# Patient Record
Sex: FEMALE | Race: ASIAN | HISPANIC OR LATINO | URBAN - METROPOLITAN AREA
[De-identification: names, ages, dates, MRNs, and addresses within clinical notes are randomized per-mention and may not be internally consistent; named-entity substitution may affect disease eponyms.]

---

## 2018-04-17 ENCOUNTER — INPATIENT (INPATIENT)
Facility: HOSPITAL | Age: 43
LOS: 1 days | Discharge: HOME | DRG: 744 | End: 2018-04-19
Attending: INTERNAL MEDICINE | Admitting: INTERNAL MEDICINE
Payer: COMMERCIAL

## 2018-04-17 ENCOUNTER — TRANSCRIPTION ENCOUNTER (OUTPATIENT)
Age: 43
End: 2018-04-17

## 2018-04-17 VITALS
SYSTOLIC BLOOD PRESSURE: 114 MMHG | TEMPERATURE: 98 F | DIASTOLIC BLOOD PRESSURE: 70 MMHG | OXYGEN SATURATION: 100 % | RESPIRATION RATE: 20 BRPM | HEART RATE: 74 BPM

## 2018-04-17 DIAGNOSIS — N84.0 POLYP OF CORPUS UTERI: ICD-10-CM

## 2018-04-17 DIAGNOSIS — D64.9 ANEMIA, UNSPECIFIED: ICD-10-CM

## 2018-04-17 DIAGNOSIS — Z98.891 HISTORY OF UTERINE SCAR FROM PREVIOUS SURGERY: Chronic | ICD-10-CM

## 2018-04-17 LAB
ALBUMIN SERPL ELPH-MCNC: 4.6 G/DL — SIGNIFICANT CHANGE UP (ref 3.3–5)
ALP SERPL-CCNC: 61 U/L — SIGNIFICANT CHANGE UP (ref 40–120)
ALT FLD-CCNC: 11 U/L — SIGNIFICANT CHANGE UP (ref 10–45)
ANION GAP SERPL CALC-SCNC: 10 MMOL/L — SIGNIFICANT CHANGE UP (ref 5–17)
ANISOCYTOSIS BLD QL: SLIGHT — SIGNIFICANT CHANGE UP
APPEARANCE UR: CLEAR — SIGNIFICANT CHANGE UP
APTT BLD: 27.4 SEC — LOW (ref 27.5–37.4)
AST SERPL-CCNC: 13 U/L — SIGNIFICANT CHANGE UP (ref 10–40)
BASOPHILS # BLD AUTO: 0.1 K/UL — SIGNIFICANT CHANGE UP (ref 0–0.2)
BASOPHILS NFR BLD AUTO: 3.3 % — HIGH (ref 0–2)
BILIRUB SERPL-MCNC: 0.2 MG/DL — SIGNIFICANT CHANGE UP (ref 0.2–1.2)
BILIRUB UR-MCNC: NEGATIVE — SIGNIFICANT CHANGE UP
BLD GP AB SCN SERPL QL: NEGATIVE — SIGNIFICANT CHANGE UP
BUN SERPL-MCNC: 10 MG/DL — SIGNIFICANT CHANGE UP (ref 7–23)
CALCIUM SERPL-MCNC: 9.5 MG/DL — SIGNIFICANT CHANGE UP (ref 8.4–10.5)
CHLORIDE SERPL-SCNC: 102 MMOL/L — SIGNIFICANT CHANGE UP (ref 96–108)
CO2 SERPL-SCNC: 29 MMOL/L — SIGNIFICANT CHANGE UP (ref 22–31)
COLOR SPEC: COLORLESS — SIGNIFICANT CHANGE UP
CREAT SERPL-MCNC: 0.6 MG/DL — SIGNIFICANT CHANGE UP (ref 0.5–1.3)
DIFF PNL FLD: NEGATIVE — SIGNIFICANT CHANGE UP
ELLIPTOCYTES BLD QL SMEAR: SLIGHT — SIGNIFICANT CHANGE UP
EOSINOPHIL # BLD AUTO: 0 K/UL — SIGNIFICANT CHANGE UP (ref 0–0.5)
EOSINOPHIL NFR BLD AUTO: 0.1 % — SIGNIFICANT CHANGE UP (ref 0–6)
GLUCOSE SERPL-MCNC: 92 MG/DL — SIGNIFICANT CHANGE UP (ref 70–99)
GLUCOSE UR QL: NEGATIVE — SIGNIFICANT CHANGE UP
HCG UR QL: NEGATIVE — SIGNIFICANT CHANGE UP
HCT VFR BLD CALC: 19.6 % — CRITICAL LOW (ref 34.5–45)
HGB BLD-MCNC: 5.6 G/DL — CRITICAL LOW (ref 11.5–15.5)
HYPOCHROMIA BLD QL: SIGNIFICANT CHANGE UP
INR BLD: 1.01 RATIO — SIGNIFICANT CHANGE UP (ref 0.88–1.16)
IRON SATN MFR SERPL: 2 % — LOW (ref 14–50)
IRON SATN MFR SERPL: 8 UG/DL — LOW (ref 30–160)
KETONES UR-MCNC: NEGATIVE — SIGNIFICANT CHANGE UP
LEUKOCYTE ESTERASE UR-ACNC: NEGATIVE — SIGNIFICANT CHANGE UP
LYMPHOCYTES # BLD AUTO: 0.9 K/UL — LOW (ref 1–3.3)
LYMPHOCYTES # BLD AUTO: 30.6 % — SIGNIFICANT CHANGE UP (ref 13–44)
MACROCYTES BLD QL: SLIGHT — SIGNIFICANT CHANGE UP
MCHC RBC-ENTMCNC: 16.1 PG — LOW (ref 27–34)
MCHC RBC-ENTMCNC: 28.5 GM/DL — LOW (ref 32–36)
MCV RBC AUTO: 56.5 FL — LOW (ref 80–100)
MICROCYTES BLD QL: SLIGHT — SIGNIFICANT CHANGE UP
MONOCYTES # BLD AUTO: 0.2 K/UL — SIGNIFICANT CHANGE UP (ref 0–0.9)
MONOCYTES NFR BLD AUTO: 5.7 % — SIGNIFICANT CHANGE UP (ref 2–14)
NEUTROPHILS # BLD AUTO: 1.8 K/UL — SIGNIFICANT CHANGE UP (ref 1.8–7.4)
NEUTROPHILS NFR BLD AUTO: 60.4 % — SIGNIFICANT CHANGE UP (ref 43–77)
NITRITE UR-MCNC: NEGATIVE — SIGNIFICANT CHANGE UP
OVALOCYTES BLD QL SMEAR: SLIGHT — SIGNIFICANT CHANGE UP
PH UR: 6.5 — SIGNIFICANT CHANGE UP (ref 5–8)
PLAT MORPH BLD: NORMAL — SIGNIFICANT CHANGE UP
PLATELET # BLD AUTO: 267 K/UL — SIGNIFICANT CHANGE UP (ref 150–400)
POIKILOCYTOSIS BLD QL AUTO: SLIGHT — SIGNIFICANT CHANGE UP
POLYCHROMASIA BLD QL SMEAR: SLIGHT — SIGNIFICANT CHANGE UP
POTASSIUM SERPL-MCNC: 4.2 MMOL/L — SIGNIFICANT CHANGE UP (ref 3.5–5.3)
POTASSIUM SERPL-SCNC: 4.2 MMOL/L — SIGNIFICANT CHANGE UP (ref 3.5–5.3)
PROT SERPL-MCNC: 7.7 G/DL — SIGNIFICANT CHANGE UP (ref 6–8.3)
PROT UR-MCNC: NEGATIVE — SIGNIFICANT CHANGE UP
PROTHROM AB SERPL-ACNC: 10.9 SEC — SIGNIFICANT CHANGE UP (ref 9.8–12.7)
RBC # BLD: 3.46 M/UL — LOW (ref 3.8–5.2)
RBC # FLD: 16.9 % — HIGH (ref 10.3–14.5)
RBC BLD AUTO: ABNORMAL
RH IG SCN BLD-IMP: NEGATIVE — SIGNIFICANT CHANGE UP
RH IG SCN BLD-IMP: NEGATIVE — SIGNIFICANT CHANGE UP
SCHISTOCYTES BLD QL AUTO: SLIGHT — SIGNIFICANT CHANGE UP
SODIUM SERPL-SCNC: 141 MMOL/L — SIGNIFICANT CHANGE UP (ref 135–145)
SP GR SPEC: 1.01 — SIGNIFICANT CHANGE UP (ref 1.01–1.02)
TARGETS BLD QL SMEAR: SLIGHT — SIGNIFICANT CHANGE UP
TIBC SERPL-MCNC: 401 UG/DL — SIGNIFICANT CHANGE UP (ref 220–430)
UIBC SERPL-MCNC: 393 UG/DL — HIGH (ref 110–370)
UROBILINOGEN FLD QL: NEGATIVE — SIGNIFICANT CHANGE UP
WBC # BLD: 3.1 K/UL — LOW (ref 3.8–10.5)
WBC # FLD AUTO: 3.1 K/UL — LOW (ref 3.8–10.5)

## 2018-04-17 PROCEDURE — 74177 CT ABD & PELVIS W/CONTRAST: CPT | Mod: 26

## 2018-04-17 PROCEDURE — 99253 IP/OBS CNSLTJ NEW/EST LOW 45: CPT | Mod: 57

## 2018-04-17 PROCEDURE — 76856 US EXAM PELVIC COMPLETE: CPT | Mod: 26

## 2018-04-17 PROCEDURE — 71275 CT ANGIOGRAPHY CHEST: CPT | Mod: 26

## 2018-04-17 PROCEDURE — 76830 TRANSVAGINAL US NON-OB: CPT | Mod: 26

## 2018-04-17 PROCEDURE — 93010 ELECTROCARDIOGRAM REPORT: CPT

## 2018-04-17 PROCEDURE — 99285 EMERGENCY DEPT VISIT HI MDM: CPT | Mod: 25

## 2018-04-17 RX ORDER — BENZOCAINE AND MENTHOL 5; 1 G/100ML; G/100ML
1 LIQUID ORAL ONCE
Qty: 0 | Refills: 0 | Status: COMPLETED | OUTPATIENT
Start: 2018-04-17 | End: 2018-04-17

## 2018-04-17 RX ORDER — SODIUM CHLORIDE 9 MG/ML
1000 INJECTION INTRAMUSCULAR; INTRAVENOUS; SUBCUTANEOUS ONCE
Qty: 0 | Refills: 0 | Status: COMPLETED | OUTPATIENT
Start: 2018-04-17 | End: 2018-04-17

## 2018-04-17 RX ADMIN — BENZOCAINE AND MENTHOL 1 LOZENGE: 5; 1 LIQUID ORAL at 22:42

## 2018-04-17 RX ADMIN — SODIUM CHLORIDE 1000 MILLILITER(S): 9 INJECTION INTRAMUSCULAR; INTRAVENOUS; SUBCUTANEOUS at 15:12

## 2018-04-17 NOTE — ED PROVIDER NOTE - OBJECTIVE STATEMENT
41 y/o F pmhx DUB, does not follow-up with her obgyn for many years, has 5 children, presenting today with weakness, fatigue, malaise, chest pain and shortness of breath on exertion for the last few days. Her  is a physician, had bloodwork done today 4/17 and noticed to have hemoglobin 5.7 hematocrit 22.7. LMP started 3 days ago, usualy does not know when period is because of continual DUB. SPt also traveled from [facility] 17hr flight last week. No fever, no chills, no abdominal pain.  Pt comes in with Dr. Digna Carlton at bedside and Dr. Rodríguez (), stating admit patient to her service, start work-up for anemia and PE in the ER and admit.

## 2018-04-17 NOTE — CONSULT NOTE ADULT - PROBLEM SELECTOR RECOMMENDATION 9
- Pt not bleeding at this time, no acute gyn intervention needed  - Pt will need an outpatient diagnostic hysteroscopy, D&C, and polypectomy both to r/o malignancy and treat likely polyp and source of her bleeding.  - Pt should be transfused while inpatient, and if bleeding does not reoccur can be discharged with outpatient f/u. Pt advised to find a gyn provider that accepts her insurance to have the procedure done.  - When pt stable for discharge, pt can be discharged with a 5 day course of 1300 mg of tranexamic acid TID to be taken if pt begins to bleed again so as to alleviate her symptoms while outpatient surgical planning is performed.    Pt seen with Dr. Taisha Navarrete, PGY-2  Pager #[phone] - Pt not bleeding at this time, no emergent gyn intervention needed  - Pt will need a diagnostic hysteroscopy, D&C, and polypectomy both to r/o malignancy and treat likely polyp and source of her bleeding. Case is tentatively being added on for tomorrow; if it cannot be done during inpatient stay, would need to be done outpatient  - Pt to be NPO after midnight  - Pt should be transfused while inpatient  - In the case that procedure cannot be done inpatient, then when pt stable for discharge, pt can be discharged with a 5 day course of 1300 mg of tranexamic acid TID to be taken if pt begins to bleed again so as to alleviate her symptoms while outpatient surgical planning is performed. TXA would not be warranted if she has surgery inpatient.    Pt seen with Dr. Sumner, d/w Dr. Daphne Navarrete, PGY-2  Pager #[phone]

## 2018-04-17 NOTE — ED PROVIDER NOTE - PHYSICAL EXAMINATION
GEN: Well Appearing, Nontoxic, NAD. Pale  HEENT: Symm Facies, PERRL, EOMI, MMM, posterior pharynx clear  CV: No JVD/Bruits or stridor;  RRR w/o m/g/r  RESP: CTAB w/o w/r/r  ABD: Soft, nt/nd, +bs, no guarding/rebound, no RUQ tender;  No CVAT  EXT: No lower extremity edema or calf tenderness. WWP, palpable pulses. FROMx4  SKIN: No erythema, lesions or rash  Neuro: Grossly intact, AOX3 with normal speech, CN II-XII intact; Sensation intact, motor 5/5 throughout; gait normal

## 2018-04-17 NOTE — ED PROVIDER NOTE - PROGRESS NOTE DETAILS
gyn pelvic with speculum exam performed by OBGYN resident, scant blood otherwise nontender and unremarkable. will transfuse prbc. -Zoey Schaffer PA-C

## 2018-04-17 NOTE — H&P ADULT - NSHPPHYSICALEXAM_GEN_ALL_CORE
PHYSICAL EXAMINATION:  Vital Signs Last 24 Hrs  T(C): 36.7 (17 Apr 2018 13:38), Max: 36.7 (17 Apr 2018 13:38)  T(F): 98 (17 Apr 2018 13:38), Max: 98 (17 Apr 2018 13:38)  HR: 74 (17 Apr 2018 13:38) (74 - 74)  BP: 114/70 (17 Apr 2018 13:38) (114/70 - 114/70)  BP(mean): --  RR: 20 (17 Apr 2018 13:38) (20 - 20)  SpO2: 100% (17 Apr 2018 13:38) (100% - 100%)  CAPILLARY BLOOD GLUCOSE            GENERAL: NAD, well-groomed,  HEAD:  atraumatic, normocephalic  EYES: sclera anicteric  ENMT: mucous membranes moist  NECK: supple, No JVD  CHEST/LUNG: clear to auscultation bilaterally;    no      rales   ,   no rhonchi,   HEART: normal S1, S2  ABDOMEN: BS+, soft, ND, NT   EXTREMITIES:    no    edema    b/l LEs  NEURO: awake, ,     moves all extremities  SKIN: no     rash

## 2018-04-17 NOTE — CONSULT NOTE ADULT - SUBJECTIVE AND OBJECTIVE BOX
Patient is a 42y old  Female who presents with a chief complaint of cp/sob (17 Apr 2018 15:07)      HPI:  pt  sent  to e r,  for  hb  of  5  has  had  a h/o of  excessive  vaginal bleeding - bleeding daily with passage of large amounts of clots for past 9-12 months.  Possible spontaneous AB 4/2017 ("felt like prior miscarriages")  +generalized weakness, no syncope or LOC +PICA - eats ice "all the time"   +dizziness +sob with exertion  also with  cp and  sob,  for  past  week, with occasional  radiation of  pain to left  arm  pt  seen  in  er   labs, pending    No meds at baseline  Has been feeling some dyspeptic symptoms for past few weeks, started PPI today  intermittently take iron supplement  +recent travel to [place] - returned 1 week ago  no leg swelling  no bleeding gums or bruising noted  no FH of GI or GYN malignancy  last Gyn exam ~4 years ago  no prior EGD or colonoscopy      PAST MEDICAL & SURGICAL HISTORY:  C section x 5  miscarriage x3    Allergies  No Known Allergies    Intolerances        MEDICATIONS  (STANDING):    MEDICATIONS  (PRN):      Social History:    Marital Status:  (  X )    (   ) Single    (   )    (  )   Occupation: homemaker  Lives with: (  ) alone  ( X ) children   ( X ) spouse   (  ) parents  (  ) other    Substance Use (street drugs): (X  ) never used  (  ) other:  Tobacco Usage:  (X   ) never smoked   (   ) former smoker   (   ) current smoker  (     ) pack year  (        ) last cigarette date  Alcohol Usage: none      Family History   IBD (  ) Yes   (X  ) No  GI Malignancy (  )  Yes    ( X ) No    Health Management     Last Colonoscopy -      (     ) Advanced Directives: (     ) None    (      ) DNR    (     ) DNI    (     ) Health Care Proxy:     Review of Systems:    General:  No wt loss, fevers, chills, night sweats,, +gen weakness and fatigue  CV:  see HPI  Resp:  No cough, tachypnea, wheezing +VELASQUEZ  GI:  No pain, No nausea, No vomiting, No diarrhea, No constipatiion, No weight loss, No fever, No pruritis, No rectal bleeding, No tarry stools, No dysphagia,  :  No pain, bleeding, incontinence, nocturia  Muscle:  No pain  Neuro:  No focal weakness, tingling, memory problems  Psych:  No fatigue, insomnia, mood problems, depression  Endocrine:  No polyuria, polydypsia, cold/heat intolerance  Heme:  see HPI  Skin:  No rash, tattoos, scars, edema      Vital Signs Last 24 Hrs  T(C): 36.7 (17 Apr 2018 13:38), Max: 36.7 (17 Apr 2018 13:38)  T(F): 98 (17 Apr 2018 13:38), Max: 98 (17 Apr 2018 13:38)  HR: 74 (17 Apr 2018 13:38) (74 - 74)  BP: 114/70 (17 Apr 2018 13:38) (114/70 - 114/70)  BP(mean): --  RR: 20 (17 Apr 2018 13:38) (20 - 20)  SpO2: 100% (17 Apr 2018 13:38) (100% - 100%)    PHYSICAL EXAM:    Constitutional: NAD, well-developed ++pallor  Neck: No LAD, supple  Respiratory: CTA anteriorly  Cardiovascular: S1 and S2, RRR, no M  Gastrointestinal: BS+, soft, NT/ND  Extremities: No peripheral edema, neg clubbing, cyanosis  Vascular: 2+ peripheral pulses  Neurological: A/O x 3, no focal deficits  Psychiatric: Normal mood, normal affect  Skin: +conjunctival pallor no petecchiae or ecchymosis        LABS:  outpt Hgb 5.7 today at physician office    ER labs testing            RADIOLOGY & ADDITIONAL TESTS:

## 2018-04-17 NOTE — H&P ADULT - ASSESSMENT
pt  with  cp/sob,  admitted  with hb of  5, as  an out  pt   labs, pending here  follow  iron studies  prbc   labs  in  am    gyn  eval, h/o  menorrhagia, no gi  bleeding  cta  chest/  abdomen, pelvis,  pending pt  with  cp/sob,  admitted  with hb of  5,  noted  as  an out  pt   labs, pending here  follow  iron studies  prbc   labs  in  am    gyn  eval pending  , h/o  menorrhagia, no gi  bleeding  cta  chest/  abdomen, pelvis,  pending

## 2018-04-17 NOTE — ED ADULT NURSE NOTE - CHPI ED SYMPTOMS NEG
no dysuria/no fever/no abdominal pain/no vaginal discharge/no vomiting/no pain/no chills/no back pain/no discharge/no nausea

## 2018-04-17 NOTE — H&P ADULT - NSHPREVIEWOFSYSTEMS_GEN_ALL_CORE
REVIEW OF SYSTEMS:    CONSTITUTIONAL: weakness,     fevers      EYES/ENT: No visual changes;    NECK: No pain   RESPIRATORY: No cough,     no   wheezing  , No    shortness of breath  CARDIOVASCULAR: No chest pain, no   palpitations  GASTROINTESTINAL: No abdominal  pain.   No    nausea, vomiting,   no   hematemesis;   No diarrhea,     no   constipation.       No  melena   ,  no     brbpr  GENITOURINARY: No dysuria,/ frequency   NEUROLOGICAL: No  focal  weakness  SKIN   No  rash  PSYCH    Alert

## 2018-04-17 NOTE — ED ADULT NURSE NOTE - OBJECTIVE STATEMENT
Pt bib spouse for eval of weakness after prolonged menstrual period which she states lasted about 1 month and finished about 3-4 days ago.  At its heaviest, she went through 3-4 pads per hour.  Conjunctiva slightly pale, overall skin color is good, skin warm and dry.  Abd soft, non-tender.

## 2018-04-17 NOTE — ED PROVIDER NOTE - MEDICAL DECISION MAKING DETAILS
41 y/o F anemia, vaginal bleeding, shortness of breath and chest pain on exertion recent travel 17h flight and anemic h/h 5.7/22.7 from today's labs. PE very pale otherwise unremarkable. Will obtain orthostatics, labs, ekg, cta chest for PE, ct abdomen and pelvis requested by admitting doctor, ob consult, admission to hospital. DICK 41 y/o F anemia, vaginal bleeding, shortness of breath and chest pain on exertion recent travel 17h flight and anemic h/h 5.7/22.7 from today's labs. has 5 kids last one 4 y ago never follow up with OB  PE very pale otherwise unremarkable. Will obtain orthostatics, labs, ekg, cta chest for PE, ct abdomen and pelvis requested by admitting doctor, ob consult, admission to hospital. LUDY.

## 2018-04-17 NOTE — CONSULT NOTE ADULT - ASSESSMENT
42 year old female presents with severe symptomatic anemia, likely severe iron deficiency anemia given symptoms/pica  No over/brisk GI blood loss  suspect GYN etiology given history    PLAN  -follow up ER labs  -agree with plans for PRBCs  -check iron studies  -recommend GYN eval  -Consider Heme input  -plans noted for CT A/P    Discussed with pt,  (physician) and Medicine attending  Thank you for the courtesy of this consult.    Joshua Carrasquillo PA-C    [facility]  [phone]

## 2018-04-17 NOTE — CONSULT NOTE ADULT - SUBJECTIVE AND OBJECTIVE BOX
43 y/o  with AUB, last episode of bleeding stopped 3 days ago, presenting to ED with severe anemia. Pt has been feeling weak and having occasional CP and VELASQUEZ for the past week. She saw her PMD where a H&H was drawn, which just resulted today with a hgb of 5.7, which is why she was advised to come to the ED. Pt reports having vaginal bleeding more days than not over the past 9-10 months, usually with bleeding only stopping for a few days and then starting up again. She reports using many pads per day during these episodes. She has not seen a gynecologist in years. She denies fevers, chills, vaginal discharge, N/V.  OBHx: c/s x5 with BTL, MAB x1 with D&C, SAB x2  GynHx: As in HPI, reports irregular menses before that but for a more normal interval of time than this  PMH: Denies  SHx: c/s, D&C, and BTL  All: NKDA  Meds: None    VS  T(C): 36.9 (18 @ 16:11)  HR: 74 (18 @ 13:38)  BP: 114/70 (18 @ 13:38)  RR: 18 (18 @ 16:11)  SpO2: 100% (18 @ 16:11)    Physical Exam:  General: NAD  Abdomen: Soft, non-tender, non-distended  Pelvic: Labia wnl w/o lesions or erythema, no discharge or bleeding in vaginal vault, no cervical discharge or erythema, no CMT, uterus not enlarged, adnexa w/o masses and tenderness               5.6    3.1   )-----------( 267      (  @ 15:32 )             19.6      15:32    141  |  102  |  10  ----------------------------<  92  4.2   |  29  |  0.60    Ca    9.5       15:32    TPro  7.7  /  Alb  4.6  /  TBili  0.2  /  DBili  x   /  AST  13  /  ALT  11  /  AlkPhos  61   @ 15:32    PT/INR - (  @ 15:32 )   PT: 10.9 sec;   INR: 1.01 ratio    PTT - (  @ 15:32 )  PTT:27.4 sec    < from: US Transvaginal (18 @ 15:57) >  INTERPRETATION:  CLINICAL INFORMATION: 42-year-old with history of   long-standing vaginal bleeding. Status post 5 C-sections.    Transabdominal and endovaginal ultrasound examination of the pelvis was   performed. No prior studies available for comparison.    The transabdominal scan was performed to assess the uterus and adnexa.   The uterus is anteverted and retroflexed, measuring 9.4 cm in length x 4   cm AP x 5.5 cm transversely. No adnexal masses are seen.    The endovaginal scan was performed to evaluate the endometrium and   ovaries. The endometrium measures 8 mm. An echogenic mass is seen in the  endometrial canal, measuring 3.9 x 1.2 x 2.1 cm and likely represents a   polyp.    The right ovary measures 3.8 x 2.8 x 3.9 cm and demonstrates a luteal   cyst, measuring 2.2 x 1.7 x 2.2 cm.    The left ovary measures 1.9 x 2.7 x 2.4 cm and is normal in appearance.    No free fluid is seen.    Impression: Lobulated endometrial mass suggesting polyp. Malignancy is   considered less likely.    Right luteal cyst.    < end of copied text >

## 2018-04-17 NOTE — CONSULT NOTE ADULT - ATTENDING COMMENTS
Patient with long-standing history of prolonged and heavy menstrual bleeding. Patient was sent in today due to documented anemia by her primary care physician.  Patient admitted to the medicine for transfusion.    Ultrasound today revealed a lobulated endometrial mass suggestive of a polyp measuring 39 x 12 x 21 mm.      Patient currently not bleeding.      Suggested plan of management  Complete blood transfusion  Patient to be scheduled for hysteroscopy, D&C, polypectomy  This may be scheduled as an outpatient  Suggest possible tranexamic acid course to treat next bleeding episode

## 2018-04-17 NOTE — ED PROVIDER NOTE - NS ED ROS FT
Constitutional: No fever or chills. pale  Eyes: No visual changes, eye pain or redness  HEENT: No throat pain, ear pain, nasal pain. No nose bleeding.  CV: chest pain and shortness of breath. no LE edema  Resp: +shortness of breath. no cough  GI: No abd pain. No nausea or vomiting. No diarrhea. No constipation.   : No dysuria, hematuria.   MSK: No musculoskeletal pain  Skin: No rash  Neuro: No headache. No numbness or tingling. No weakness.

## 2018-04-17 NOTE — ED PROVIDER NOTE - ATTENDING CONTRIBUTION TO CARE
43 y/o F anemia, vaginal bleeding, shortness of breath and chest pain on exertion recent travel 17h flight and anemic h/h 5.7/22.7 from today's labs. PE very pale otherwise unremarkable. Will obtain orthostatics, labs, ekg, cta chest for PE, ct abdomen and pelvis requested by admitting doctor, ob consult, admission to hospital. DICK

## 2018-04-17 NOTE — H&P ADULT - HISTORY OF PRESENT ILLNESS
pt  sent  to e r,  for  hb  of  5  has  had  a h/o of  excessive  vaginal bleeding  also with  cp and  sob,  for  past  week  , with occasional  radiation of  pain to left  arm  pt  seen  in  er   labs, pending   cta chest/ abdomen, pending   pt  to get prbc pt  sent  to e r,  for  hb  of  5  has  had  a h/o of  excessive  vaginal bleeding  also with  cp and  sob,  for  past  week  , with occasional  radiation of  pain to left  arm, recent trip   from [facility].   pt  seen  in  er   labs, pending   cta chest/ abdomen, pending   pt  to get prbc

## 2018-04-18 ENCOUNTER — RESULT REVIEW (OUTPATIENT)
Age: 43
End: 2018-04-18

## 2018-04-18 LAB
GLUCOSE BLDC GLUCOMTR-MCNC: 99 MG/DL — SIGNIFICANT CHANGE UP (ref 70–99)
HCT VFR BLD CALC: 26.5 % — LOW (ref 34.5–45)
HCT VFR BLD CALC: 30.9 % — LOW (ref 34.5–45)
HGB BLD-MCNC: 8.2 G/DL — LOW (ref 11.5–15.5)
HGB BLD-MCNC: 9.4 G/DL — LOW (ref 11.5–15.5)
MCHC RBC-ENTMCNC: 19.5 PG — LOW (ref 27–34)
MCHC RBC-ENTMCNC: 20.1 PG — LOW (ref 27–34)
MCHC RBC-ENTMCNC: 30.4 GM/DL — LOW (ref 32–36)
MCHC RBC-ENTMCNC: 31.1 GM/DL — LOW (ref 32–36)
MCV RBC AUTO: 64.2 FL — LOW (ref 80–100)
MCV RBC AUTO: 64.6 FL — LOW (ref 80–100)
PLATELET # BLD AUTO: 211 K/UL — SIGNIFICANT CHANGE UP (ref 150–400)
PLATELET # BLD AUTO: 222 K/UL — SIGNIFICANT CHANGE UP (ref 150–400)
RBC # BLD: 4.1 M/UL — SIGNIFICANT CHANGE UP (ref 3.8–5.2)
RBC # BLD: 4.81 M/UL — SIGNIFICANT CHANGE UP (ref 3.8–5.2)
RBC # FLD: 25.5 % — HIGH (ref 10.3–14.5)
RBC # FLD: 25.6 % — HIGH (ref 10.3–14.5)
WBC # BLD: 3.6 K/UL — LOW (ref 3.8–10.5)
WBC # BLD: 3.9 K/UL — SIGNIFICANT CHANGE UP (ref 3.8–10.5)
WBC # FLD AUTO: 3.6 K/UL — LOW (ref 3.8–10.5)
WBC # FLD AUTO: 3.9 K/UL — SIGNIFICANT CHANGE UP (ref 3.8–10.5)

## 2018-04-18 PROCEDURE — 88305 TISSUE EXAM BY PATHOLOGIST: CPT | Mod: 26

## 2018-04-18 PROCEDURE — 83020 HEMOGLOBIN ELECTROPHORESIS: CPT | Mod: 26

## 2018-04-18 PROCEDURE — 58558 HYSTEROSCOPY BIOPSY: CPT

## 2018-04-18 RX ORDER — BENZOCAINE AND MENTHOL 5; 1 G/100ML; G/100ML
1 LIQUID ORAL ONCE
Qty: 0 | Refills: 0 | Status: COMPLETED | OUTPATIENT
Start: 2018-04-18 | End: 2018-04-18

## 2018-04-18 RX ORDER — IRON SUCROSE 20 MG/ML
200 INJECTION, SOLUTION INTRAVENOUS EVERY 24 HOURS
Qty: 0 | Refills: 0 | Status: DISCONTINUED | OUTPATIENT
Start: 2018-04-18 | End: 2018-04-18

## 2018-04-18 RX ORDER — HYDROMORPHONE HYDROCHLORIDE 2 MG/ML
0.5 INJECTION INTRAMUSCULAR; INTRAVENOUS; SUBCUTANEOUS
Qty: 0 | Refills: 0 | Status: DISCONTINUED | OUTPATIENT
Start: 2018-04-18 | End: 2018-04-18

## 2018-04-18 RX ORDER — IRON SUCROSE 20 MG/ML
100 INJECTION, SOLUTION INTRAVENOUS DAILY
Qty: 0 | Refills: 0 | Status: DISCONTINUED | OUTPATIENT
Start: 2018-04-18 | End: 2018-04-19

## 2018-04-18 RX ORDER — SENNA PLUS 8.6 MG/1
1 TABLET ORAL ONCE
Qty: 0 | Refills: 0 | Status: COMPLETED | OUTPATIENT
Start: 2018-04-18 | End: 2018-04-18

## 2018-04-18 RX ORDER — ONDANSETRON 8 MG/1
4 TABLET, FILM COATED ORAL ONCE
Qty: 0 | Refills: 0 | Status: DISCONTINUED | OUTPATIENT
Start: 2018-04-18 | End: 2018-04-18

## 2018-04-18 RX ORDER — DOCUSATE SODIUM 100 MG
100 CAPSULE ORAL ONCE
Qty: 0 | Refills: 0 | Status: COMPLETED | OUTPATIENT
Start: 2018-04-18 | End: 2018-04-18

## 2018-04-18 RX ADMIN — BENZOCAINE AND MENTHOL 1 LOZENGE: 5; 1 LIQUID ORAL at 01:59

## 2018-04-18 RX ADMIN — Medication 100 MILLIGRAM(S): at 23:34

## 2018-04-18 RX ADMIN — SENNA PLUS 1 TABLET(S): 8.6 TABLET ORAL at 23:34

## 2018-04-18 RX ADMIN — IRON SUCROSE 210 MILLIGRAM(S): 20 INJECTION, SOLUTION INTRAVENOUS at 20:05

## 2018-04-18 NOTE — PROGRESS NOTE ADULT - ASSESSMENT
41 y/o  with AUB likely from polyp, not bleeding at this time but with symptomatic severe anemia, s/p 2U pRBC transfusion with appropriate H&H rise

## 2018-04-18 NOTE — PROGRESS NOTE ADULT - SUBJECTIVE AND OBJECTIVE BOX
Patient seen and examined at bedside, no acute overnight events. Still reports some general weakness, no other complaints. Pt has been NPO overnight    Vital Signs Last 24 Hours  T(C): 36.9 (04-18-18 @ 04:29), Max: 37.1 (04-17-18 @ 23:10)  HR: 71 (04-18-18 @ 04:29) (71 - 87)  BP: 109/72 (04-18-18 @ 04:29) (96/64 - 119/77)  RR: 18 (04-18-18 @ 04:29) (16 - 20)  SpO2: 99% (04-18-18 @ 04:29) (98% - 100%)    Physical Exam:  General: NAD  Abdomen: Soft, non-tender, non-distended, +BS    Labs:             8.2<L>  3.9   )-----------( 222      ( 04-18 @ 03:27 )             26.5<L>               5.6<LL>  3.1<L> )-----------( 267      ( 04-17 @ 15:32 )             19.6<LL>

## 2018-04-18 NOTE — PATIENT PROFILE ADULT. - NSTOBACCONEVERSMOKERY/N_GEN_A
THE [facility] Emergency Department in [address] [facility]    Phone:  [phone]    Fax:  [address]   MRN: [id number]    Department:  THE [facility] Emergency Department in [place]   Date of Visit: Pediatric [id number] Emergency Department   [phone]       To Check ER Wait Times:  TEXT 'ERwait' to 29525      Click [email or web address]      Or call [phone]    If you have any problems with your follow-up, please call our case [facility] before you leave. After you leave, you should follow the attached instructions. I have read and understand the instructions given to me by my caregivers. 24-Hour Pharmacies        Pharmacy Address Phone Number   [facility] [address]. [employer] access allows you to view health information for your child from their recent   visit, view other health information and more. To sign up or find more information on getting   Proxy Access to your child’s [employer] go to [email or web address] No

## 2018-04-18 NOTE — BRIEF OPERATIVE NOTE - POST-OP DX
Abnormal uterine bleeding (AUB)  04/18/2018    Paulette Cazares  Endometrial polyp  04/18/2018    Active  Paulette Benitez

## 2018-04-18 NOTE — BRIEF OPERATIVE NOTE - PROCEDURE
<<-----Click on this checkbox to enter Procedure Hysteroscopy with dilation and curettage of uterus  04/18/2018    Active  SAILAJAEEARRON  Polypectomy, endometrium  04/18/2018    Active  SAILAJAEETHAM

## 2018-04-18 NOTE — BRIEF OPERATIVE NOTE - OPERATION/FINDINGS
Anteverted retroflexed uterus 9wks sized, no adnexal masses. Endometrial polyp extending into endocervical canal extending from right anterolateral aspect of lower uterine cavity. Diffuse polypoid tissue visualized predominantly on posterior uterine wall.

## 2018-04-18 NOTE — PROGRESS NOTE ADULT - SUBJECTIVE AND OBJECTIVE BOX
Patient is a 42y old  Female who presented with a chief complaint of cp/sob (2018 15:07)      INTERVAL HPI/OVERNIGHT EVENTS:  s/p PRBCs yesterday "I feel better"  planned GYN D&C and polypectomy today  no abdominal pain, rectal bleeding. currently no vaginal bleeding    MEDICATIONS  (STANDING):  iron sucrose IVPB 200 milliGRAM(s) IV Intermittent every 24 hours    MEDICATIONS  (PRN):      Allergies  No Known Allergies      Review of Systems:  General:  No wt loss, fevers, chills, night sweats,, +gen weakness and fatigue- improving  CV:  denies CP or SOB  Resp:  No cough, tachypnea, wheezing   :  No pain, bleeding, incontinence, nocturia  Muscle:  No pain  Endocrine:  No polyuria, polydypsia, cold/heat intolerance  Skin:  No rash, tattoos, scars, edema    Vital Signs Last 24 Hrs  T(C): 37.1 (2018 12:18), Max: 37.1 (2018 23:10)  T(F): 98.7 (2018 12:18), Max: 98.8 (2018 23:10)  HR: 71 (2018 12:18) (71 - 87)  BP: 107/67 (2018 12:18) (96/64 - 119/77)  BP(mean): 79 (2018 21:17) (79 - 79)  RR: 18 (2018 12:18) (16 - 20)  SpO2: 100% (2018 12:18) (98% - 100%)    PHYSICAL EXAM:  Constitutional: NAD, well-developed improved pallor  Neck: No LAD, supple  Respiratory: CTA anteriorly  Cardiovascular: S1 and S2, RRR, no M  Gastrointestinal: BS+, soft, NT/ND  Extremities: No peripheral edema, neg clubbing, cyanosis  Vascular: 2+ peripheral pulses  Neurological: A/O x 3, no focal deficits  Psychiatric: Normal mood, normal affect  Skin: +conjunctival pallor no petecchiae or ecchymosis    LABS:                        9.4    3.6   )-----------( 211      ( 2018 09:52 )             30.9   Hemoglobin: 9.4 g/dL <L> [11.5 - 15.5] (-18 @ 09:52)  Hemoglobin: 8.2 g/dL <L> [11.5 - 15.5] ( @ 03:27)  s/p 2 units PRBCs  Hemoglobin: 5.6 g/dL <LL> [11.5 - 15.5] ( @ 15:32)      Iron with Total Binding Capacity (18 @ 19:09)    % Saturation, Iron: 2 %    Iron - Total Binding Capacity.: 401 ug/dL    Iron Total, Serum: 8 ug/dL    Unsaturated Iron Binding Capacity: 393 ug/dL        141  |  102  |  10  ----------------------------<  92  4.2   |  29  |  0.60    Ca    9.5      2018 15:32    TPro  7.7  /  Alb  4.6  /  TBili  0.2  /  DBili  x   /  AST  13  /  ALT  11  /  AlkPhos  61      PT/INR - ( 2018 15:32 )   PT: 10.9 sec;   INR: 1.01 ratio    PTT - ( 2018 15:32 )  PTT:27.4 sec    Urinalysis Basic - ( 2018 18:23 )    Color: Colorless / Appearance: Clear / S.010 / pH: x  Gluc: x / Ketone: Negative  / Bili: Negative / Urobili: Negative   Blood: x / Protein: Negative / Nitrite: Negative   Leuk Esterase: Negative / RBC: x / WBC x   Sq Epi: x / Non Sq Epi: x / Bacteria: x      RADIOLOGY & ADDITIONAL TESTS:  < from: CT Abdomen and Pelvis w/ IV Cont (18 @ 20:13) >    INTERPRETATION:  CLINICAL INFORMATION: Shortness of breath, recent travel.    COMPARISON: Noneavailable.    PROCEDURE:   CT of the Chest, Abdomen and Pelvis was performed with intravenous   contrast.   Intravenous contrast: 90 ml Omnipaque 350. 10 ml discarded.  Oral contrast: None.  Sagittal and coronal reformats were performed.      FINDINGS:    CHEST:     LUNGS AND LARGE AIRWAYS: Patent central airways. No focal consolidations.   No pulmonary nodules.  PLEURA: No pleural effusion. No pneumothorax.  VESSELS: No pulmonary emboli.  HEART: Heart size is normal. No pericardial effusion.  MEDIASTINUM AND HAI: No lymphadenopathy.  CHEST WALL AND LOWER NECK: Within normal limits.    ABDOMEN AND PELVIS:    LIVER: Subcentimeter hypodense lesion in the right liver too small to   characterize.  BILE DUCTS: Normal caliber.  GALLBLADDER: Withinnormal limits.  SPLEEN: Within normal limits.  PANCREAS: Within normal limits.  ADRENALS: Within normal limits.  KIDNEYS/URETERS: Within normal limits.    BLADDER: Within normal limits.  REPRODUCTIVE ORGANS: A structure is seen within the endometriummeasuring   approximately 2.5 x 1.9 x 2.8 cm representing the mass seen on recent   pelvic sonogram. A 2.1 x 1.7 centimeter right ovarian cyst.    BOWEL: No bowel obstruction. Appendix is normal.  PERITONEUM: No ascites. No intraperitoneal free air.  VESSELS:  Within normal limits.  RETROPERITONEUM: No lymphadenopathy.    ABDOMINAL WALL: Within normal limits.  BONES: Within normal limits.    IMPRESSION:   No pulmonary emboli.  A 2.8 cm mass within the endometrial cavity.      < from: US Transvaginal (18 @ 15:57) >  INTERPRETATION:  CLINICAL INFORMATION: 42-year-old with history of   long-standing vaginal bleeding. Status post 5 C-sections.    Transabdominal and endovaginal ultrasound examination of the pelvis was   performed. No prior studies available for comparison.    The transabdominal scan was performed to assess the uterus and adnexa.   The uterus is anteverted and retroflexed, measuring 9.4 cm in length x 4   cm AP x 5.5 cm transversely. No adnexal masses are seen.    The endovaginal scan was performed to evaluate the endometrium and   ovaries. The endometrium measures 8 mm. An echogenic mass is seen in the  endometrial canal, measuring 3.9 x 1.2 x 2.1 cm and likely represents a   polyp.    The right ovary measures 3.8 x 2.8 x 3.9 cm and demonstrates a luteal   cyst, measuring 2.2 x 1.7 x 2.2 cm.    The left ovary measures 1.9 x 2.7 x 2.4 cm and is normal in appearance.    No free fluid is seen.    Impression: Lobulated endometrial mass suggesting polyp. Malignancy is   considered less likely.    Right luteal cyst.

## 2018-04-18 NOTE — PROGRESS NOTE ADULT - PROBLEM SELECTOR PLAN 1
- Pt on add on schedule for Hysteroscopic polypectomy and D&C today; if case cannot be done today and pt stable for discharge from medicine perspective, it will be planned outpatient instead, pt does not need to stay inpatient for this procedure    Alexey Navarrete, PGY-2  Pager #[phone]

## 2018-04-18 NOTE — PROGRESS NOTE ADULT - SUBJECTIVE AND OBJECTIVE BOX
weak,  no  complaints    MEDICATIONS  (STANDING):    MEDICATIONS  (PRN):      Vital Signs Last 24 Hrs  T(C): 36.9 (2018 04:29), Max: 37.1 (2018 23:10)  T(F): 98.4 (2018 04:29), Max: 98.8 (2018 23:10)  HR: 71 (2018 04:29) (71 - 87)  BP: 109/72 (2018 04:29) (96/64 - 119/77)  BP(mean): 79 (2018 21:17) (79 - 79)  RR: 18 (2018 04:29) (16 - 20)  SpO2: 99% (2018 04:29) (98% - 100%)  CAPILLARY BLOOD GLUCOSE      POCT Blood Glucose.: 99 mg/dL (2018 07:41)    I&O's Summary    2018 07:01  -  2018 07:00  --------------------------------------------------------  IN: 600 mL / OUT: 0 mL / NET: 600 mL        PHYSICAL EXAM:  HEAD:  Atraumatic, Normocephalic  NECK: Supple, No JVD  CHEST/LUNG:   no     rales,     no,    rhonchi  HEART: Regular rate and rhythm;         murmur  ABDOMEN: Soft, Nontender, ;   EXTREMITIES:       no      edema  NEUROLOGY:  alert    LABS:                        8.2    3.9   )-----------( 222      ( 2018 03:27 )             26.5     04-17    141  |  102  |  10  ----------------------------<  92  4.2   |  29  |  0.60    Ca    9.5      2018 15:32    TPro  7.7  /  Alb  4.6  /  TBili  0.2  /  DBili  x   /  AST  13  /  ALT  11  /  AlkPhos  61  04-17    PT/INR - ( 2018 15:32 )   PT: 10.9 sec;   INR: 1.01 ratio         PTT - ( 2018 15:32 )  PTT:27.4 sec      Urinalysis Basic - ( 2018 18:23 )    Color: Colorless / Appearance: Clear / S.010 / pH: x  Gluc: x / Ketone: Negative  / Bili: Negative / Urobili: Negative   Blood: x / Protein: Negative / Nitrite: Negative   Leuk Esterase: Negative / RBC: x / WBC x   Sq Epi: x / Non Sq Epi: x / Bacteria: x                      Consultant(s) Notes Reviewed:      Care Discussed with Consultants/Other Providers:

## 2018-04-18 NOTE — PROGRESS NOTE ADULT - ASSESSMENT
42 year old female presents with severe symptomatic anemia, likely severe iron deficiency anemia given symptoms/pica  No over/brisk GI blood loss  Brisk Hgb response to PRBCs  Acute blood loss anemia due to GYN etiology - lobulated endometrial mass, likely polyp on imaging - awaiting hysteroscopy, D&C, polypectomy    PLAN  -IV iron as per primary team  -await GYN intervention/follow-up    Discussed with pt and Medicine attending    Joshua Carrasquillo PA-C    [facility]  [phone]

## 2018-04-18 NOTE — BRIEF OPERATIVE NOTE - PRE-OP DX
Abnormal uterine bleeding  04/18/2018    Paulette Cazares  Endometrial polyp  04/18/2018    Paulette Cazares

## 2018-04-19 ENCOUNTER — TRANSCRIPTION ENCOUNTER (OUTPATIENT)
Age: 43
End: 2018-04-19

## 2018-04-19 VITALS
OXYGEN SATURATION: 97 % | DIASTOLIC BLOOD PRESSURE: 65 MMHG | RESPIRATION RATE: 18 BRPM | HEART RATE: 60 BPM | SYSTOLIC BLOOD PRESSURE: 105 MMHG | TEMPERATURE: 98 F

## 2018-04-19 LAB
ANION GAP SERPL CALC-SCNC: 14 MMOL/L — SIGNIFICANT CHANGE UP (ref 5–17)
BUN SERPL-MCNC: 9 MG/DL — SIGNIFICANT CHANGE UP (ref 7–23)
CALCIUM SERPL-MCNC: 9.4 MG/DL — SIGNIFICANT CHANGE UP (ref 8.4–10.5)
CHLORIDE SERPL-SCNC: 99 MMOL/L — SIGNIFICANT CHANGE UP (ref 96–108)
CO2 SERPL-SCNC: 26 MMOL/L — SIGNIFICANT CHANGE UP (ref 22–31)
CREAT SERPL-MCNC: 0.56 MG/DL — SIGNIFICANT CHANGE UP (ref 0.5–1.3)
GLUCOSE SERPL-MCNC: 104 MG/DL — HIGH (ref 70–99)
HCT VFR BLD CALC: 29.1 % — LOW (ref 34.5–45)
HGB BLD-MCNC: 8.2 G/DL — LOW (ref 11.5–15.5)
MCHC RBC-ENTMCNC: 18.6 PG — LOW (ref 27–34)
MCHC RBC-ENTMCNC: 28.2 GM/DL — LOW (ref 32–36)
MCV RBC AUTO: 66.1 FL — LOW (ref 80–100)
PLATELET # BLD AUTO: 225 K/UL — SIGNIFICANT CHANGE UP (ref 150–400)
POTASSIUM SERPL-MCNC: 3.7 MMOL/L — SIGNIFICANT CHANGE UP (ref 3.5–5.3)
POTASSIUM SERPL-SCNC: 3.7 MMOL/L — SIGNIFICANT CHANGE UP (ref 3.5–5.3)
RBC # BLD: 4.4 M/UL — SIGNIFICANT CHANGE UP (ref 3.8–5.2)
RBC # FLD: 24.2 % — HIGH (ref 10.3–14.5)
SODIUM SERPL-SCNC: 139 MMOL/L — SIGNIFICANT CHANGE UP (ref 135–145)
WBC # BLD: 5.48 K/UL — SIGNIFICANT CHANGE UP (ref 3.8–10.5)
WBC # FLD AUTO: 5.48 K/UL — SIGNIFICANT CHANGE UP (ref 3.8–10.5)

## 2018-04-19 PROCEDURE — 81025 URINE PREGNANCY TEST: CPT

## 2018-04-19 PROCEDURE — 71275 CT ANGIOGRAPHY CHEST: CPT

## 2018-04-19 PROCEDURE — 83550 IRON BINDING TEST: CPT

## 2018-04-19 PROCEDURE — 85027 COMPLETE CBC AUTOMATED: CPT

## 2018-04-19 PROCEDURE — 76830 TRANSVAGINAL US NON-OB: CPT

## 2018-04-19 PROCEDURE — 83020 HEMOGLOBIN ELECTROPHORESIS: CPT

## 2018-04-19 PROCEDURE — 86900 BLOOD TYPING SEROLOGIC ABO: CPT

## 2018-04-19 PROCEDURE — 82962 GLUCOSE BLOOD TEST: CPT

## 2018-04-19 PROCEDURE — 85610 PROTHROMBIN TIME: CPT

## 2018-04-19 PROCEDURE — 93005 ELECTROCARDIOGRAM TRACING: CPT

## 2018-04-19 PROCEDURE — 86923 COMPATIBILITY TEST ELECTRIC: CPT

## 2018-04-19 PROCEDURE — 80048 BASIC METABOLIC PNL TOTAL CA: CPT

## 2018-04-19 PROCEDURE — 88305 TISSUE EXAM BY PATHOLOGIST: CPT

## 2018-04-19 PROCEDURE — 74177 CT ABD & PELVIS W/CONTRAST: CPT

## 2018-04-19 PROCEDURE — P9016: CPT

## 2018-04-19 PROCEDURE — 86850 RBC ANTIBODY SCREEN: CPT

## 2018-04-19 PROCEDURE — 80053 COMPREHEN METABOLIC PANEL: CPT

## 2018-04-19 PROCEDURE — 86901 BLOOD TYPING SEROLOGIC RH(D): CPT

## 2018-04-19 PROCEDURE — 81003 URINALYSIS AUTO W/O SCOPE: CPT

## 2018-04-19 PROCEDURE — 99285 EMERGENCY DEPT VISIT HI MDM: CPT

## 2018-04-19 PROCEDURE — 76856 US EXAM PELVIC COMPLETE: CPT

## 2018-04-19 PROCEDURE — 36430 TRANSFUSION BLD/BLD COMPNT: CPT

## 2018-04-19 PROCEDURE — 85730 THROMBOPLASTIN TIME PARTIAL: CPT

## 2018-04-19 RX ORDER — DOCUSATE SODIUM 100 MG
1 CAPSULE ORAL
Qty: 60 | Refills: 0 | OUTPATIENT
Start: 2018-04-19 | End: 2018-05-18

## 2018-04-19 RX ORDER — FERROUS SULFATE 325(65) MG
1 TABLET ORAL
Qty: 60 | Refills: 0 | OUTPATIENT
Start: 2018-04-19 | End: 2018-05-18

## 2018-04-19 RX ORDER — SENNA PLUS 8.6 MG/1
2 TABLET ORAL
Qty: 6 | Refills: 0 | OUTPATIENT
Start: 2018-04-19 | End: 2018-04-21

## 2018-04-19 RX ADMIN — IRON SUCROSE 210 MILLIGRAM(S): 20 INJECTION, SOLUTION INTRAVENOUS at 10:04

## 2018-04-19 NOTE — PROGRESS NOTE ADULT - PROBLEM SELECTOR PLAN 1
status post D&C, hysteroscopy, polypectomy, patient should follow up in GYN clinic in 1-2 weeks for post-op check and for pathology results. Call  for an appointment  status post 2u PRBC for symptomatic anemia, currently minimal bleeding. f/u repeat cbc from this am  routine post operative care: encourage OOB, increase incentive spirometry, po pain meds for pain control, c/w regular diet status post D&C, hysteroscopy, polypectomy, patient should follow up in GYN clinic in 1-2 weeks for post-op check and for pathology results. Call  for an appointment  status post 2u PRBC for symptomatic anemia, currently minimal bleeding. f/u repeat cbc from this am  routine post operative care: encourage OOB, increase incentive spirometry, po pain meds for pain control, c/w regular diet  stable for discharge from GYN standpoint

## 2018-04-19 NOTE — DISCHARGE NOTE ADULT - MEDICATION SUMMARY - MEDICATIONS TO TAKE
I will START or STAY ON the medications listed below when I get home from the hospital:  None I will START or STAY ON the medications listed below when I get home from the hospital:    ferrous sulfate 325 mg (65 mg elemental iron) oral delayed release tablet  -- 1 tab(s) by mouth 2 times a day   -- May discolor urine or feces.  Swallow whole.  Do not crush.    -- Indication: For Iron deficiency anemia I will START or STAY ON the medications listed below when I get home from the hospital:    ferrous sulfate 325 mg (65 mg elemental iron) oral delayed release tablet  -- 1 tab(s) by mouth 2 times a day   -- May discolor urine or feces.  Swallow whole.  Do not crush.    -- Indication: For Iron deficiency anemia    Colace 100 mg oral capsule  -- 1 cap(s) by mouth 2 times a day   -- Medication should be taken with plenty of water.    -- Indication: For constipation    senna oral tablet  -- 2 tab(s) by mouth once a day   -- Indication: For constipation

## 2018-04-19 NOTE — DISCHARGE NOTE ADULT - HOSPITAL COURSE
43 y/o female P5 a/w severe anemia and VELASQUEZ 2/2 AUB x9-10 months, now POD#1 status post D&C, hysteroscopy, polypectomy. currently stable.Endometrial polyp: status post D&C, hysteroscopy, polypectomy, patient should follow up in GYN clinic in 1-2 weeks for post-op check and for pathology results. Call  for an appointment  status post 2u PRBC for symptomatic anemia, currently minimal bleeding. f/u repeat cbc from this am  routine post operative care: encourage OOB, increase incentive spirometry, po pain meds for pain control, c/w regular diet  stable for discharge from GYN standpoint. 41 y/o female P5 who was admitted with  severe anemia and VELASQUZE 2/2 AUB x9-10 months, now POD#1 status post D&C, hysteroscopy, polypectomy. currently stable . Hgb /Hct 8.2/29.1 , stable after blood transfusion and IV iron x 2 .   Endometrial polyp: status post D&C, hysteroscopy, polypectomy, patient should follow up in GYN clinic in 1-2 weeks for post-op check and for pathology results. Call  for an appointment.   Pt is stable for discharge today. She will continue with oral Ferrous Sulfate 325mg twice daily . Follow-up with PMD, Gyn clinic as above.

## 2018-04-19 NOTE — PROGRESS NOTE ADULT - SUBJECTIVE AND OBJECTIVE BOX
s/p  polpectomy  doing well    REVIEW OF SYSTEMS:  CONSTITUTIONAL: No weakness,  no   fevers      RESPIRATORY:  No    shortness of breath  CARDIOVASCULAR: No chest pain,   GASTROINTESTINAL: No abdominal  pain  NEUROLOGICAL: No  focal  weakness    MEDICATIONS  (STANDING):  iron sucrose IVPB 100 milliGRAM(s) IV Intermittent daily    MEDICATIONS  (PRN):      Vital Signs Last 24 Hrs  T(C): 36.7 (2018 05:35), Max: 37.1 (2018 12:18)  T(F): 98.1 (2018 05:35), Max: 98.7 (2018 12:18)  HR: 60 (2018 05:35) (60 - 82)  BP: 105/65 (2018 05:35) (103/60 - 116/69)  BP(mean): 79 (2018 18:15) (76 - 86)  RR: 18 (2018 05:35) (15 - 18)  SpO2: 97% (2018 05:35) (97% - 100%)  CAPILLARY BLOOD GLUCOSE        I&O's Summary    2018 07:01  -  2018 07:00  --------------------------------------------------------  IN: 240 mL / OUT: 0 mL / NET: 240 mL        PHYSICAL EXAM:  HEAD:  Atraumatic, Normocephalic  NECK: Supple, No JVD  CHEST/LUNG:   no     rales,     no,    rhonchi  HEART: Regular rate and rhythm;         murmur  ABDOMEN: Soft, Nontender, ;   EXTREMITIES:  less      edema  NEUROLOGY:  alert    LABS:                        9.4    3.6   )-----------( 211      ( 2018 09:52 )             30.9     04-19    139  |  99  |  9   ----------------------------<  104<H>  3.7   |  26  |  0.56    Ca    9.4      2018 07:17    TPro  7.7  /  Alb  4.6  /  TBili  0.2  /  DBili  x   /  AST  13  /  ALT  11  /  AlkPhos  61  04-17    PT/INR - ( 2018 15:32 )   PT: 10.9 sec;   INR: 1.01 ratio         PTT - ( 2018 15:32 )  PTT:27.4 sec      Urinalysis Basic - ( 2018 18:23 )    Color: Colorless / Appearance: Clear / S.010 / pH: x  Gluc: x / Ketone: Negative  / Bili: Negative / Urobili: Negative   Blood: x / Protein: Negative / Nitrite: Negative   Leuk Esterase: Negative / RBC: x / WBC x   Sq Epi: x / Non Sq Epi: x / Bacteria: x                      Consultant(s) Notes Reviewed:      Care Discussed with Consultants/Other Providers:

## 2018-04-19 NOTE — DISCHARGE NOTE ADULT - CARE PROVIDER_API CALL
Gyn Clinic,   Phone: [phone]  Fax: (   )    -    Elsi Sumner), Obstetrics and Gynecology  [address] [place] [address]  Phone: [phone]  Fax: [phone]

## 2018-04-19 NOTE — PROGRESS NOTE ADULT - SUBJECTIVE AND OBJECTIVE BOX
Patient is a 42y old  Female who presented with a chief complaint of cp/sob (2018 15:07)    INTERVAL HPI/OVERNIGHT EVENTS:  resting comfortably    MEDICATIONS  (STANDING):  iron sucrose IVPB 100 milliGRAM(s) IV Intermittent daily    MEDICATIONS  (PRN):    Allergies  No Known Allergies    Review of Systems:  General:  No wt loss, fevers, chills, night sweats,, +gen weakness and fatigue- improving  CV:  denies CP or SOB  Resp:  No cough, tachypnea, wheezing   :  No pain, bleeding, incontinence, nocturia  Muscle:  No pain  Endocrine:  No polyuria, polydypsia, cold/heat intolerance  Skin:  No rash, tattoos, scars, edema    T(F): 98.1 (18 @ 05:35), Max: 98.7 (18 @ 12:18)  HR: 60 (18 @ 05:35) (60 - 82)  BP: 105/65 (18 @ 05:35) (103/60 - 116/69)  RR: 18 (18 @ 05:35) (15 - 18)  SpO2: 97% (18 @ 05:35) (97% - 100%)  Wt(kg): --  ,   I&O's Summary    2018 07:01  -  2018 07:00  --------------------------------------------------------  IN: 240 mL / OUT: 0 mL / NET: 240 mL    PHYSICAL EXAM:  Constitutional: NAD, well-developed improved pallor  Neck: No LAD, supple  Respiratory: CTA anteriorly  Cardiovascular: S1 and S2, RRR, no M  Gastrointestinal: BS+, soft, NT/ND  Extremities: No peripheral edema, neg clubbing, cyanosis  Vascular: 2+ peripheral pulses  Neurological: A/O x 3, no focal deficits  Psychiatric: Normal mood, normal affect  Skin: +conjunctival pallor no petecchiae or ecchymosis    LABS:                        9.4    3.6   )-----------( 211      ( 2018 09:52 )             30.9               -19    139  |  99  |  9   ----------------------------<  104<H>  3.7   |  26  |  0.56    Ca    9.4      2018 07:17    TPro  7.7  /  Alb  4.6  /  TBili  0.2  /  DBili  x   /  AST  13  /  ALT  11  /  AlkPhos  61  04-17    PT/INR - ( 2018 15:32 )   PT: 10.9 sec;   INR: 1.01 ratio    PTT - ( 2018 15:32 )  PTT:27.4 sec                 Urinalysis Basic - ( 2018 18:23 )  Color: Colorless / Appearance: Clear / S.010 / pH: x  Gluc: x / Ketone: Negative  / Bili: Negative / Urobili: Negative   Blood: x / Protein: Negative / Nitrite: Negative   Leuk Esterase: Negative / RBC: x / WBC x   Sq Epi: x / Non Sq Epi: x / Bacteria: x    Hemoglobin: 9.4 g/dL <L> [11.5 - 15.5] ( @ 09:52)  Hemoglobin: 8.2 g/dL <L> [11.5 - 15.5] ( @ 03:27)  s/p 2 units PRBCs  Hemoglobin: 5.6 g/dL <LL> [11.5 - 15.5] ( @ 15:32)    Iron with Total Binding Capacity (18 @ 19:09)    % Saturation, Iron: 2 %    Iron - Total Binding Capacity.: 401 ug/dL    Iron Total, Serum: 8 ug/dL    Unsaturated Iron Binding Capacity: 393 ug/dL    RADIOLOGY & ADDITIONAL TESTS:  < from: CT Abdomen and Pelvis w/ IV Cont (18 @ 20:13) >    INTERPRETATION:  CLINICAL INFORMATION: Shortness of breath, recent travel.    COMPARISON: Noneavailable.    PROCEDURE:   CT of the Chest, Abdomen and Pelvis was performed with intravenous   contrast.   Intravenous contrast: 90 ml Omnipaque 350. 10 ml discarded.  Oral contrast: None.  Sagittal and coronal reformats were performed.    FINDINGS:    CHEST:     LUNGS AND LARGE AIRWAYS: Patent central airways. No focal consolidations.   No pulmonary nodules.  PLEURA: No pleural effusion. No pneumothorax.  VESSELS: No pulmonary emboli.  HEART: Heart size is normal. No pericardial effusion.  MEDIASTINUM AND AHI: No lymphadenopathy.  CHEST WALL AND LOWER NECK: Within normal limits.    ABDOMEN AND PELVIS:    LIVER: Subcentimeter hypodense lesion in the right liver too small to   characterize.  BILE DUCTS: Normal caliber.  GALLBLADDER: Withinnormal limits.  SPLEEN: Within normal limits.  PANCREAS: Within normal limits.  ADRENALS: Within normal limits.  KIDNEYS/URETERS: Within normal limits.    BLADDER: Within normal limits.  REPRODUCTIVE ORGANS: A structure is seen within the endometriummeasuring   approximately 2.5 x 1.9 x 2.8 cm representing the mass seen on recent   pelvic sonogram. A 2.1 x 1.7 centimeter right ovarian cyst.    BOWEL: No bowel obstruction. Appendix is normal.  PERITONEUM: No ascites. No intraperitoneal free air.  VESSELS:  Within normal limits.  RETROPERITONEUM: No lymphadenopathy.    ABDOMINAL WALL: Within normal limits.  BONES: Within normal limits.    IMPRESSION:   No pulmonary emboli.  A 2.8 cm mass within the endometrial cavity.      < from: US Transvaginal (18 @ 15:57) >  INTERPRETATION:  CLINICAL INFORMATION: 42-year-old with history of   long-standing vaginal bleeding. Status post 5 C-sections.    Transabdominal and endovaginal ultrasound examination of the pelvis was   performed. No prior studies available for comparison.    The transabdominal scan was performed to assess the uterus and adnexa.   The uterus is anteverted and retroflexed, measuring 9.4 cm in length x 4   cm AP x 5.5 cm transversely. No adnexal masses are seen.    The endovaginal scan was performed to evaluate the endometrium and   ovaries. The endometrium measures 8 mm. An echogenic mass is seen in the  endometrial canal, measuring 3.9 x 1.2 x 2.1 cm and likely represents a   polyp.    The right ovary measures 3.8 x 2.8 x 3.9 cm and demonstrates a luteal   cyst, measuring 2.2 x 1.7 x 2.2 cm.    The left ovary measures 1.9 x 2.7 x 2.4 cm and is normal in appearance.    No free fluid is seen.    Impression: Lobulated endometrial mass suggesting polyp. Malignancy is   considered less likely.    Right luteal cyst.

## 2018-04-19 NOTE — DISCHARGE NOTE ADULT - PATIENT PORTAL LINK FT
You can access the [facility] Patient Portal, offered by [facility], by registering with the following website: [email or web address]

## 2018-04-19 NOTE — DISCHARGE NOTE ADULT - CARE PLAN
Principal Discharge DX:	Anemia  Goal:	Resolved  Assessment and plan of treatment:	Symptoms to report, bleeding, palpitations, fatigue, pale skin, cold skin, dizziness. Take medications as ordered by PCP  Secondary Diagnosis:	Abnormal uterine bleeding (AUB)  Assessment and plan of treatment:	Follow-up at Gyn Clinic in 1 to 2 weeks. Call  for an appointment  Secondary Diagnosis:	Endometrial polyp  Assessment and plan of treatment:	Status post D&C, hysteroscopy, polypectomy.  Follow-up in GYN clinic in 1-2 weeks for post-op check and for pathology results. Call  for an appointment

## 2018-04-19 NOTE — DISCHARGE NOTE ADULT - PLAN OF CARE
Resolved Symptoms to report, bleeding, palpitations, fatigue, pale skin, cold skin, dizziness. Take medications as ordered by PCP Follow-up at Gyn Clinic in 1 to 2 weeks. Call  for an appointment Status post D&C, hysteroscopy, polypectomy.  Follow-up in GYN clinic in 1-2 weeks for post-op check and for pathology results. Call  for an appointment

## 2018-04-19 NOTE — DISCHARGE NOTE ADULT - ADDITIONAL INSTRUCTIONS
Follow up in GYN clinic in 1-2 weeks for post-op check and for pathology results. Call  for an appointment

## 2018-04-19 NOTE — PROGRESS NOTE ADULT - ASSESSMENT
42 year old female presents with severe symptomatic anemia, likely severe iron deficiency anemia given symptoms/pica  No over/brisk GI blood loss  Brisk Hgb response to PRBCs  Acute blood loss anemia due to GYN etiology - likely due to lobulated endometrial mass, likely polyp on imaging    Rec-  -IV iron as per primary team  -Further Rx as per Gyn

## 2018-04-19 NOTE — PROGRESS NOTE ADULT - ASSESSMENT
pt  with  cp/sob,  admitted  with hb of  5,  noted  as  an out  pt   excessive  vaginal  bleeding, seen by gyn  iron deficiency anemia  from vaginal bleeding    hb  improved  to  8 , s/p  prbc  wbc  has  improved  , h/o  menorrhagia, no gi  bleeding  cta  chest/  abdomen, pelvis,   endometrial mass  s/p   d/c  and polypectomy, by gyn  hb, pending today,  if stable, then will d/c  pt      < from: US Transvaginal (04.17.18 @ 15:57) >    Impression: Lobulated endometrial mass suggesting polyp. Malignancy is   considered less likely.    Right luteal cyst.          < end of copied text >

## 2018-04-19 NOTE — DISCHARGE NOTE ADULT - CARE PROVIDERS DIRECT ADDRESSES
,[email or web address],[email or web address]

## 2018-04-19 NOTE — PROGRESS NOTE ADULT - SUBJECTIVE AND OBJECTIVE BOX
HD#3, POD#1  S: Pt seen and examined at bedside. Pt states mild abdominal pain. Pt is ambulating, tolerating regular diet, passing flatus, no BM since surgery, but urinating adequately.   Pt denies fever, chills, chest pain, SOB, nausea, vomiting, lightheadedness, dizziness.      T(F): 98.1 (04-19-18 @ 05:35), Max: 98.7 (04-18-18 @ 12:18)  HR: 60 (04-19-18 @ 05:35) (60 - 82)  BP: 105/65 (04-19-18 @ 05:35) (103/60 - 116/69)  RR: 18 (04-19-18 @ 05:35) (15 - 18)  SpO2: 97% (04-19-18 @ 05:35) (97% - 100%)    I&O's Summary    17 Apr 2018 07:01  -  18 Apr 2018 07:00  --------------------------------------------------------  IN: 600 mL / OUT: 0 mL / NET: 600 mL    18 Apr 2018 07:01  -  19 Apr 2018 06:57  --------------------------------------------------------  IN: 240 mL / OUT: 0 mL / NET: 240 mL        MEDICATIONS  (STANDING):  iron sucrose IVPB 100 milliGRAM(s) IV Intermittent daily        Physical Exam:  Constitutional: NAD  Pulmonary: clear to auscultation bilaterally   Cardiovascular: Regular rate and rhythm   Abdomen: incision site clean, dry, intact. Soft, mildly tender, not distended, no guarding, no rebound, + bowel sounds  VE: minimal vaginal bleeding  Extremities: no lower extremity edema or calve tenderness. SCDs in place     LABS:                        9.4    3.6   )-----------( 211      ( 18 Apr 2018 09:52 )             30.9

## 2018-04-19 NOTE — PROGRESS NOTE ADULT - ASSESSMENT
41 y/o female P5 a/w severe anemia and VELASQUEZ 2/2 AUB x9-10 months, now POD#1 status post D&C, hysteroscopy, polypectomy. currently stable

## 2018-04-20 LAB
HEMOGLOBIN INTERPRETATION: SIGNIFICANT CHANGE UP
HGB A MFR BLD: 97.9 % — SIGNIFICANT CHANGE UP (ref 95.8–98)
HGB A2 MFR BLD: 2.1 % — SIGNIFICANT CHANGE UP (ref 2–3.2)

## 2019-01-10 ENCOUNTER — APPOINTMENT (OUTPATIENT)
Dept: OBGYN | Facility: CLINIC | Age: 44
End: 2019-01-10
Payer: COMMERCIAL

## 2019-01-10 VITALS
WEIGHT: 148 LBS | DIASTOLIC BLOOD PRESSURE: 80 MMHG | BODY MASS INDEX: 26.22 KG/M2 | HEIGHT: 63 IN | SYSTOLIC BLOOD PRESSURE: 110 MMHG

## 2019-01-10 DIAGNOSIS — Z78.9 OTHER SPECIFIED HEALTH STATUS: ICD-10-CM

## 2019-01-10 PROCEDURE — 99213 OFFICE O/P EST LOW 20 MIN: CPT | Mod: 25

## 2019-01-10 PROCEDURE — 76830 TRANSVAGINAL US NON-OB: CPT

## 2019-01-11 NOTE — PROCEDURE
[Abnormal Uterine Bleeding] : abnormal uterine bleeding [Transvaginal Ultrasound] : transvaginal ultrasound [Present] : uterus present [No Fibroid(s)] : no fibroid(s) [L: ___ cm] : L: [unfilled] cm [W: ___cm] : W: [unfilled] cm [H: ___ cm] : H: [unfilled] cm [FreeTextEntry3] : Heavy flow in recent months [FreeTextEntry5] : ECHO 9.2 mm [FreeTextEntry6] : simple 2 x 1.6 mm cyst, no free fluid [FreeTextEntry7] : 3 x 1.7 x 2 cm  [FreeTextEntry8] : 3.5 x 2.0 cm  [FreeTextEntry4] : Normal Ultrasound with small left follicle

## 2019-01-11 NOTE — CHIEF COMPLAINT
[Follow Up] : follow up GYN visit [FreeTextEntry1] : 44 yo  with LMP 18 presents for follow up for heavy menses.

## 2020-12-30 NOTE — DISCHARGE NOTE ADULT - PHYSICIAN SECTION COMPLETE
Please call daughter-in-law ABIEL Barkley   [facility]   Intake Dept   [phone]   
Yes

## 2021-05-27 ENCOUNTER — APPOINTMENT (OUTPATIENT)
Dept: OBGYN | Facility: CLINIC | Age: 46
End: 2021-05-27
Payer: COMMERCIAL

## 2021-05-27 ENCOUNTER — NON-APPOINTMENT (OUTPATIENT)
Age: 46
End: 2021-05-27

## 2021-05-27 VITALS
SYSTOLIC BLOOD PRESSURE: 92 MMHG | WEIGHT: 108 LBS | BODY MASS INDEX: 20.39 KG/M2 | HEIGHT: 61 IN | DIASTOLIC BLOOD PRESSURE: 62 MMHG

## 2021-05-27 DIAGNOSIS — Z01.419 ENCOUNTER FOR GYNECOLOGICAL EXAMINATION (GENERAL) (ROUTINE) W/OUT ABNORMAL FINDINGS: ICD-10-CM

## 2021-05-27 DIAGNOSIS — Z12.39 ENCOUNTER FOR OTHER SCREENING FOR MALIGNANT NEOPLASM OF BREAST: ICD-10-CM

## 2021-05-27 PROCEDURE — 99396 PREV VISIT EST AGE 40-64: CPT

## 2021-05-27 NOTE — HISTORY OF PRESENT ILLNESS
[FreeTextEntry1] : 46 yo  with LMP 2021 for annual\par \par 5 c/s and two miscarriages.\par Never OCP\par feeling well, but daughter under stress. Took leave from school, [employer]

## 2021-05-28 LAB — HPV HIGH+LOW RISK DNA PNL CVX: NOT DETECTED

## 2022-03-01 ENCOUNTER — EMERGENCY (EMERGENCY)
Facility: HOSPITAL | Age: 47
LOS: 1 days | Discharge: HOME | End: 2022-03-01
Attending: CLINIC/CENTER
Payer: COMMERCIAL

## 2022-03-01 VITALS
DIASTOLIC BLOOD PRESSURE: 102 MMHG | HEART RATE: 66 BPM | SYSTOLIC BLOOD PRESSURE: 155 MMHG | HEIGHT: 61 IN | RESPIRATION RATE: 20 BRPM | OXYGEN SATURATION: 100 % | TEMPERATURE: 98 F | WEIGHT: 100.09 LBS

## 2022-03-01 VITALS
TEMPERATURE: 98 F | SYSTOLIC BLOOD PRESSURE: 134 MMHG | DIASTOLIC BLOOD PRESSURE: 87 MMHG | HEART RATE: 69 BPM | OXYGEN SATURATION: 100 % | RESPIRATION RATE: 22 BRPM

## 2022-03-01 DIAGNOSIS — Z98.891 HISTORY OF UTERINE SCAR FROM PREVIOUS SURGERY: Chronic | ICD-10-CM

## 2022-03-01 LAB
ALBUMIN SERPL ELPH-MCNC: 4.8 G/DL — SIGNIFICANT CHANGE UP (ref 3.3–5)
ALP SERPL-CCNC: 51 U/L — SIGNIFICANT CHANGE UP (ref 40–120)
ALT FLD-CCNC: 9 U/L — LOW (ref 10–45)
ANION GAP SERPL CALC-SCNC: 10 MMOL/L — SIGNIFICANT CHANGE UP (ref 5–17)
APTT BLD: 29.1 SEC — SIGNIFICANT CHANGE UP (ref 27.5–35.5)
AST SERPL-CCNC: 12 U/L — SIGNIFICANT CHANGE UP (ref 10–40)
BASE EXCESS BLDV CALC-SCNC: 1.5 MMOL/L — SIGNIFICANT CHANGE UP (ref -2–2)
BASOPHILS # BLD AUTO: 0.03 K/UL — SIGNIFICANT CHANGE UP (ref 0–0.2)
BASOPHILS NFR BLD AUTO: 0.6 % — SIGNIFICANT CHANGE UP (ref 0–2)
BILIRUB SERPL-MCNC: 0.2 MG/DL — SIGNIFICANT CHANGE UP (ref 0.2–1.2)
BLD GP AB SCN SERPL QL: NEGATIVE — SIGNIFICANT CHANGE UP
BUN SERPL-MCNC: 15 MG/DL — SIGNIFICANT CHANGE UP (ref 7–23)
CA-I SERPL-SCNC: 1.21 MMOL/L — SIGNIFICANT CHANGE UP (ref 1.15–1.33)
CALCIUM SERPL-MCNC: 9.2 MG/DL — SIGNIFICANT CHANGE UP (ref 8.4–10.5)
CHLORIDE BLDV-SCNC: 102 MMOL/L — SIGNIFICANT CHANGE UP (ref 96–108)
CHLORIDE SERPL-SCNC: 103 MMOL/L — SIGNIFICANT CHANGE UP (ref 96–108)
CO2 BLDV-SCNC: 29 MMOL/L — HIGH (ref 22–26)
CO2 SERPL-SCNC: 25 MMOL/L — SIGNIFICANT CHANGE UP (ref 22–31)
CREAT SERPL-MCNC: 0.64 MG/DL — SIGNIFICANT CHANGE UP (ref 0.5–1.3)
EGFR: 112 ML/MIN/1.73M2 — SIGNIFICANT CHANGE UP
EOSINOPHIL # BLD AUTO: 0.03 K/UL — SIGNIFICANT CHANGE UP (ref 0–0.5)
EOSINOPHIL NFR BLD AUTO: 0.6 % — SIGNIFICANT CHANGE UP (ref 0–6)
GAS PNL BLDV: 139 MMOL/L — SIGNIFICANT CHANGE UP (ref 136–145)
GAS PNL BLDV: SIGNIFICANT CHANGE UP
GAS PNL BLDV: SIGNIFICANT CHANGE UP
GLUCOSE BLDV-MCNC: 126 MG/DL — HIGH (ref 70–99)
GLUCOSE SERPL-MCNC: 123 MG/DL — HIGH (ref 70–99)
HCG SERPL-ACNC: <2 MIU/ML — SIGNIFICANT CHANGE UP
HCO3 BLDV-SCNC: 28 MMOL/L — SIGNIFICANT CHANGE UP (ref 22–29)
HCT VFR BLD CALC: 31.8 % — LOW (ref 34.5–45)
HCT VFR BLDA CALC: 29 % — LOW (ref 34.5–46.5)
HGB BLD CALC-MCNC: 9.6 G/DL — LOW (ref 11.7–16.1)
HGB BLD-MCNC: 9.2 G/DL — LOW (ref 11.5–15.5)
IMM GRANULOCYTES NFR BLD AUTO: 0.2 % — SIGNIFICANT CHANGE UP (ref 0–1.5)
INR BLD: 1.04 RATIO — SIGNIFICANT CHANGE UP (ref 0.88–1.16)
LACTATE BLDV-MCNC: 1.1 MMOL/L — SIGNIFICANT CHANGE UP (ref 0.7–2)
LYMPHOCYTES # BLD AUTO: 1.66 K/UL — SIGNIFICANT CHANGE UP (ref 1–3.3)
LYMPHOCYTES # BLD AUTO: 33.7 % — SIGNIFICANT CHANGE UP (ref 13–44)
MCHC RBC-ENTMCNC: 21.7 PG — LOW (ref 27–34)
MCHC RBC-ENTMCNC: 28.9 GM/DL — LOW (ref 32–36)
MCV RBC AUTO: 75 FL — LOW (ref 80–100)
MONOCYTES # BLD AUTO: 0.29 K/UL — SIGNIFICANT CHANGE UP (ref 0–0.9)
MONOCYTES NFR BLD AUTO: 5.9 % — SIGNIFICANT CHANGE UP (ref 2–14)
NEUTROPHILS # BLD AUTO: 2.91 K/UL — SIGNIFICANT CHANGE UP (ref 1.8–7.4)
NEUTROPHILS NFR BLD AUTO: 59 % — SIGNIFICANT CHANGE UP (ref 43–77)
NRBC # BLD: 0 /100 WBCS — SIGNIFICANT CHANGE UP (ref 0–0)
PCO2 BLDV: 50 MMHG — HIGH (ref 39–42)
PH BLDV: 7.35 — SIGNIFICANT CHANGE UP (ref 7.32–7.43)
PLATELET # BLD AUTO: 249 K/UL — SIGNIFICANT CHANGE UP (ref 150–400)
PO2 BLDV: 20 MMHG — LOW (ref 25–45)
POTASSIUM BLDV-SCNC: 4.1 MMOL/L — SIGNIFICANT CHANGE UP (ref 3.5–5.1)
POTASSIUM SERPL-MCNC: 4.4 MMOL/L — SIGNIFICANT CHANGE UP (ref 3.5–5.3)
POTASSIUM SERPL-SCNC: 4.4 MMOL/L — SIGNIFICANT CHANGE UP (ref 3.5–5.3)
PROT SERPL-MCNC: 7.6 G/DL — SIGNIFICANT CHANGE UP (ref 6–8.3)
PROTHROM AB SERPL-ACNC: 12.1 SEC — SIGNIFICANT CHANGE UP (ref 10.5–13.4)
RBC # BLD: 4.24 M/UL — SIGNIFICANT CHANGE UP (ref 3.8–5.2)
RBC # FLD: 14.7 % — HIGH (ref 10.3–14.5)
RH IG SCN BLD-IMP: NEGATIVE — SIGNIFICANT CHANGE UP
SAO2 % BLDV: 25.9 % — LOW (ref 67–88)
SODIUM SERPL-SCNC: 138 MMOL/L — SIGNIFICANT CHANGE UP (ref 135–145)
WBC # BLD: 4.93 K/UL — SIGNIFICANT CHANGE UP (ref 3.8–10.5)
WBC # FLD AUTO: 4.93 K/UL — SIGNIFICANT CHANGE UP (ref 3.8–10.5)

## 2022-03-01 PROCEDURE — 99284 EMERGENCY DEPT VISIT MOD MDM: CPT

## 2022-03-01 PROCEDURE — 86900 BLOOD TYPING SEROLOGIC ABO: CPT

## 2022-03-01 PROCEDURE — 85014 HEMATOCRIT: CPT

## 2022-03-01 PROCEDURE — 36415 COLL VENOUS BLD VENIPUNCTURE: CPT

## 2022-03-01 PROCEDURE — 82803 BLOOD GASES ANY COMBINATION: CPT

## 2022-03-01 PROCEDURE — 85018 HEMOGLOBIN: CPT

## 2022-03-01 PROCEDURE — 99285 EMERGENCY DEPT VISIT HI MDM: CPT | Mod: 25

## 2022-03-01 PROCEDURE — 84132 ASSAY OF SERUM POTASSIUM: CPT

## 2022-03-01 PROCEDURE — 86901 BLOOD TYPING SEROLOGIC RH(D): CPT

## 2022-03-01 PROCEDURE — 84702 CHORIONIC GONADOTROPIN TEST: CPT

## 2022-03-01 PROCEDURE — 84295 ASSAY OF SERUM SODIUM: CPT

## 2022-03-01 PROCEDURE — 71045 X-RAY EXAM CHEST 1 VIEW: CPT

## 2022-03-01 PROCEDURE — 80053 COMPREHEN METABOLIC PANEL: CPT

## 2022-03-01 PROCEDURE — 83605 ASSAY OF LACTIC ACID: CPT

## 2022-03-01 PROCEDURE — 82330 ASSAY OF CALCIUM: CPT

## 2022-03-01 PROCEDURE — 85730 THROMBOPLASTIN TIME PARTIAL: CPT

## 2022-03-01 PROCEDURE — 93005 ELECTROCARDIOGRAM TRACING: CPT

## 2022-03-01 PROCEDURE — 85025 COMPLETE CBC W/AUTO DIFF WBC: CPT

## 2022-03-01 PROCEDURE — 71045 X-RAY EXAM CHEST 1 VIEW: CPT | Mod: 26

## 2022-03-01 PROCEDURE — 82435 ASSAY OF BLOOD CHLORIDE: CPT

## 2022-03-01 PROCEDURE — 86850 RBC ANTIBODY SCREEN: CPT

## 2022-03-01 PROCEDURE — 85610 PROTHROMBIN TIME: CPT

## 2022-03-01 PROCEDURE — 82947 ASSAY GLUCOSE BLOOD QUANT: CPT

## 2022-03-01 NOTE — ED PROVIDER NOTE - PROGRESS NOTE DETAILS
Keith Shoemaker MD, Attending: discussed the results and will go home and follow-up with GYN outpatient. verbal d/c and return precaution was given to the patient and  and both understand. Stable for d/c and pcp follow up.

## 2022-03-01 NOTE — ED PROVIDER NOTE - RAPID ASSESSMENT
46 F presents to ER w/ abnormal blood work finding Hemoglobin <5 and c/o dizziness x 1 week. Reports CP and SOB. Pt states she has had blood transfusions in past for heavy menstrual periods. Pt is currently menstruating. Denies recent heavy menstrual periods, rectal bleeding, hematemesis, and abdominal pain.    **Pt seen in the waiting room via teletriage by Soniya Giraldo (), documentation completed by [place] -Bucky Trujillo. Pt to be sent to main ED for further evaluation - all orders placed to be followed by MD in the main ED**  Scribe Statement: Ronnie ALBARRAN Cole (scribketurah), attest that this documentation has been prepared under the direction and in the presence of oSniya Giraldo ()

## 2022-03-01 NOTE — ED PROVIDER NOTE - NSFOLLOWUPINSTRUCTIONS_ED_ALL_ED_FT
Thank you for visiting our Emergency Department, it has been a pleasure taking part in your healthcare.    Your discharge diagnosis is: vaginal bleeding  Please follow-up with GYN outpatient.     Return precautions to the Emergency Department include but are not limited to: unrelenting nausea, vomiting, fever, chills, chest pain, shortness of breath, dizziness, abdominal pain, worsening pain, syncope, blood in urine or stool, headache that doesn't resolve, numbness or tingling, loss of sensation, loss of motor function, or any other concerning symptoms.

## 2022-03-01 NOTE — ED PROVIDER NOTE - OBJECTIVE STATEMENT
pt comes to the ER for low Hg of 5 taken today. She has been feeling weak, trouble getting air in when she breaths, no CP or palpitations, heavy menses which is not new. trouble with breathing has been for several months. denies calf pain or swelling. 46F, h.o fibroids s/p removal, p.w vaginal bleeding and an outpatient lab showing hemoglobin of 5 and told to come in. Reports starting menstrual today. reports 1 year of mild shortness of breath but no new symptoms. No n/v, chest pain, LE swelling or pain.

## 2022-03-01 NOTE — ED PROVIDER NOTE - PATIENT PORTAL LINK FT
You can access the FollowMyHealth Patient Portal offered by [facility] by registering at the following website: [email or web address]. By joining [facility]’s FollowMyHealth portal, you will also be able to view your health information using other applications (apps) compatible with our system.

## 2022-03-01 NOTE — ED PROVIDER NOTE - ATTENDING CONTRIBUTION TO CARE
Agree with above except noted:     low hemoglobin on outpatient lab today in the setting of vaginal bleeding. menstrual started today not as bad as prior when she had fibroids. chronic shortness of breath for 1 year. no cough, chest pain, LE edema or pain. low suspicion for PE or infectious etiology. lab in the ER showed hemoglobin of 9. patient appears comfortable. likely lab misread/error. no sign of severe hemorrhage due to good blood pressure and non tachycardia and nontoxic appearing, NAD. soft abdomen, no guarding or distension, unlikely surgical abdomen and low suspicion for ischemic bowel at this time. neg for pregnancy, will not do TVUS and d/c with gyn outpatient follow-up

## 2022-03-01 NOTE — ED PROVIDER NOTE - CARE PLAN
Principal Discharge DX:	Anemia   1 Principal Discharge DX:	Anemia  Secondary Diagnosis:	Menorrhagia

## 2022-03-01 NOTE — ED ADULT NURSE NOTE - OBJECTIVE STATEMENT
47 y/o female presenting to ED ambulatory, A&OX3, complaining of abnormal lab results. as per patient, she was sent in from PCP for hemoglobin of 5. pt reports feeling increased dizziness and lightheadedness over the course of the week. pt reports having started her menstrual cycle today without heavy bleeding. pt denies chest pain, shortness of breath, n/v/d, fevers, chills, cough, syncopal episodes, melena, urinary symptoms. breathing spontaneous and unlabored. abd soft nontender. capillary refill <2seconds. Safety and comfort measures provided, bed locked and in lowest position, side rails up for safety. Call bell within reach. Awaiting md cook 7

## 2022-03-09 ENCOUNTER — APPOINTMENT (OUTPATIENT)
Dept: RADIOLOGY | Facility: HOSPITAL | Age: 47
End: 2022-03-09
Payer: COMMERCIAL

## 2022-03-09 ENCOUNTER — OUTPATIENT (OUTPATIENT)
Dept: OUTPATIENT SERVICES | Facility: HOSPITAL | Age: 47
LOS: 1 days | End: 2022-03-09
Payer: COMMERCIAL

## 2022-03-09 DIAGNOSIS — R13.14 DYSPHAGIA, PHARYNGOESOPHAGEAL PHASE: ICD-10-CM

## 2022-03-09 DIAGNOSIS — Z98.891 HISTORY OF UTERINE SCAR FROM PREVIOUS SURGERY: Chronic | ICD-10-CM

## 2022-03-09 DIAGNOSIS — Z00.00 ENCOUNTER FOR GENERAL ADULT MEDICAL EXAMINATION WITHOUT ABNORMAL FINDINGS: ICD-10-CM

## 2022-03-09 PROCEDURE — 74221 X-RAY XM ESOPHAGUS 2CNTRST: CPT | Mod: 26

## 2022-03-09 PROCEDURE — 74221 X-RAY XM ESOPHAGUS 2CNTRST: CPT

## 2022-03-28 ENCOUNTER — APPOINTMENT (OUTPATIENT)
Dept: OBGYN | Facility: CLINIC | Age: 47
End: 2022-03-28
Payer: COMMERCIAL

## 2022-03-28 DIAGNOSIS — R63.0 ANOREXIA: ICD-10-CM

## 2022-03-28 PROCEDURE — 99442: CPT | Mod: 95

## 2022-03-28 NOTE — HISTORY OF PRESENT ILLNESS
[FreeTextEntry1] : 45 yo  with LMP 3/1/22 with 4 months of anorexia, constipation 10 months.\par \par She called office today when her GI mentioned ovarian cancer. She does not have changes to bowel nor bladder.\par She has not gained wt but lost 6# over past. \par \par She reports "webbing" of her esophagus on endoscopy. will clarify in future.\par \par \par \par \par \par  [Home] : at home, [unfilled] , at the time of the visit. [Medical Office: ([facility])___] : at the medical office located in

## 2024-12-19 NOTE — ED ADULT NURSE NOTE - CAS ELECT INFOMATION PROVIDED
I acted within this role throughout the entirety of the procedure performed by the primary surgeon DC instructions